# Patient Record
Sex: FEMALE | Race: WHITE | NOT HISPANIC OR LATINO | Employment: FULL TIME | URBAN - METROPOLITAN AREA
[De-identification: names, ages, dates, MRNs, and addresses within clinical notes are randomized per-mention and may not be internally consistent; named-entity substitution may affect disease eponyms.]

---

## 2017-05-14 ENCOUNTER — APPOINTMENT (OUTPATIENT)
Dept: CT IMAGING | Facility: CLINIC | Age: 52
End: 2017-05-14
Attending: EMERGENCY MEDICINE
Payer: COMMERCIAL

## 2017-05-14 ENCOUNTER — HOSPITAL ENCOUNTER (EMERGENCY)
Facility: CLINIC | Age: 52
Discharge: HOME | End: 2017-05-14
Attending: EMERGENCY MEDICINE | Admitting: EMERGENCY MEDICINE
Payer: COMMERCIAL

## 2017-05-14 VITALS
SYSTOLIC BLOOD PRESSURE: 155 MMHG | DIASTOLIC BLOOD PRESSURE: 88 MMHG | TEMPERATURE: 98.8 F | HEART RATE: 60 BPM | RESPIRATION RATE: 16 BRPM | HEIGHT: 60 IN | BODY MASS INDEX: 32.2 KG/M2 | OXYGEN SATURATION: 98 % | WEIGHT: 164 LBS

## 2017-05-14 DIAGNOSIS — R10.32 ABDOMINAL PAIN, LEFT LOWER QUADRANT: ICD-10-CM

## 2017-05-14 DIAGNOSIS — M25.552 HIP PAIN, LEFT: ICD-10-CM

## 2017-05-14 LAB
ALBUMIN UR-MCNC: NEGATIVE MG/DL
ANION GAP SERPL CALCULATED.3IONS-SCNC: 8 MMOL/L (ref 3–14)
APPEARANCE UR: CLEAR
BASOPHILS # BLD AUTO: 0.1 10E9/L (ref 0–0.2)
BASOPHILS NFR BLD AUTO: 0.8 %
BILIRUB UR QL STRIP: NEGATIVE
BUN SERPL-MCNC: 16 MG/DL (ref 7–30)
CALCIUM SERPL-MCNC: 8.6 MG/DL (ref 8.5–10.1)
CHLORIDE SERPL-SCNC: 107 MMOL/L (ref 94–109)
CO2 BLDCOV-SCNC: 24 MMOL/L (ref 21–28)
CO2 SERPL-SCNC: 25 MMOL/L (ref 20–32)
COLOR UR AUTO: YELLOW
CREAT SERPL-MCNC: 0.92 MG/DL (ref 0.52–1.04)
DIFFERENTIAL METHOD BLD: NORMAL
EOSINOPHIL # BLD AUTO: 0.2 10E9/L (ref 0–0.7)
EOSINOPHIL NFR BLD AUTO: 2.9 %
ERYTHROCYTE [DISTWIDTH] IN BLOOD BY AUTOMATED COUNT: 12.8 % (ref 10–15)
GFR SERPL CREATININE-BSD FRML MDRD: 63 ML/MIN/1.7M2
GLUCOSE SERPL-MCNC: 131 MG/DL (ref 70–99)
GLUCOSE UR STRIP-MCNC: NEGATIVE MG/DL
HCT VFR BLD AUTO: 43.5 % (ref 35–47)
HGB BLD-MCNC: 15.2 G/DL (ref 11.7–15.7)
HGB UR QL STRIP: NEGATIVE
IMM GRANULOCYTES # BLD: 0 10E9/L (ref 0–0.4)
IMM GRANULOCYTES NFR BLD: 0.4 %
KETONES UR STRIP-MCNC: NEGATIVE MG/DL
LACTATE BLD-SCNC: 1.8 MMOL/L (ref 0.7–2.1)
LEUKOCYTE ESTERASE UR QL STRIP: NEGATIVE
LYMPHOCYTES # BLD AUTO: 1.6 10E9/L (ref 0.8–5.3)
LYMPHOCYTES NFR BLD AUTO: 21.5 %
MCH RBC QN AUTO: 32.7 PG (ref 26.5–33)
MCHC RBC AUTO-ENTMCNC: 34.9 G/DL (ref 31.5–36.5)
MCV RBC AUTO: 94 FL (ref 78–100)
MONOCYTES # BLD AUTO: 0.5 10E9/L (ref 0–1.3)
MONOCYTES NFR BLD AUTO: 7.3 %
NEUTROPHILS # BLD AUTO: 4.9 10E9/L (ref 1.6–8.3)
NEUTROPHILS NFR BLD AUTO: 67.1 %
NITRATE UR QL: NEGATIVE
NRBC # BLD AUTO: 0 10*3/UL
NRBC BLD AUTO-RTO: 0 /100
PCO2 BLDV: 43 MM HG (ref 40–50)
PH BLDV: 7.35 PH (ref 7.32–7.43)
PH UR STRIP: 5.5 PH (ref 5–7)
PLATELET # BLD AUTO: 270 10E9/L (ref 150–450)
PO2 BLDV: 76 MM HG (ref 25–47)
POTASSIUM SERPL-SCNC: 4.2 MMOL/L (ref 3.4–5.3)
RBC # BLD AUTO: 4.65 10E12/L (ref 3.8–5.2)
SAO2 % BLDV FROM PO2: 94 %
SODIUM SERPL-SCNC: 140 MMOL/L (ref 133–144)
SP GR UR STRIP: 1.01 (ref 1–1.03)
URN SPEC COLLECT METH UR: NORMAL
UROBILINOGEN UR STRIP-ACNC: 0.2 EU/DL (ref 0.2–1)
WBC # BLD AUTO: 7.3 10E9/L (ref 4–11)

## 2017-05-14 PROCEDURE — 74176 CT ABD & PELVIS W/O CONTRAST: CPT

## 2017-05-14 PROCEDURE — 82803 BLOOD GASES ANY COMBINATION: CPT

## 2017-05-14 PROCEDURE — 80048 BASIC METABOLIC PNL TOTAL CA: CPT | Performed by: EMERGENCY MEDICINE

## 2017-05-14 PROCEDURE — 83605 ASSAY OF LACTIC ACID: CPT

## 2017-05-14 PROCEDURE — 99285 EMERGENCY DEPT VISIT HI MDM: CPT | Mod: 25

## 2017-05-14 PROCEDURE — 85025 COMPLETE CBC W/AUTO DIFF WBC: CPT | Performed by: EMERGENCY MEDICINE

## 2017-05-14 PROCEDURE — 96361 HYDRATE IV INFUSION ADD-ON: CPT

## 2017-05-14 PROCEDURE — 96374 THER/PROPH/DIAG INJ IV PUSH: CPT

## 2017-05-14 PROCEDURE — 25000128 H RX IP 250 OP 636: Performed by: EMERGENCY MEDICINE

## 2017-05-14 PROCEDURE — 81003 URINALYSIS AUTO W/O SCOPE: CPT | Performed by: EMERGENCY MEDICINE

## 2017-05-14 RX ORDER — IBUPROFEN 600 MG/1
600 TABLET, FILM COATED ORAL EVERY 6 HOURS PRN
Qty: 21 TABLET | Refills: 0 | Status: SHIPPED | OUTPATIENT
Start: 2017-05-14

## 2017-05-14 RX ORDER — MORPHINE SULFATE 4 MG/ML
4 INJECTION, SOLUTION INTRAMUSCULAR; INTRAVENOUS ONCE
Status: COMPLETED | OUTPATIENT
Start: 2017-05-14 | End: 2017-05-14

## 2017-05-14 RX ADMIN — SODIUM CHLORIDE 1000 ML: 9 INJECTION, SOLUTION INTRAVENOUS at 17:24

## 2017-05-14 RX ADMIN — MORPHINE SULFATE 4 MG: 4 INJECTION, SOLUTION INTRAMUSCULAR; INTRAVENOUS at 17:28

## 2017-05-14 ASSESSMENT — ENCOUNTER SYMPTOMS
BLOOD IN STOOL: 0
COUGH: 0
VOMITING: 0
WEAKNESS: 0
HEMATURIA: 0
DIARRHEA: 0
FLANK PAIN: 1
CHILLS: 0
ABDOMINAL PAIN: 1
NUMBNESS: 1
JOINT SWELLING: 0
SHORTNESS OF BREATH: 0
NAUSEA: 0
FREQUENCY: 0
DYSURIA: 0
CONSTIPATION: 0
FEVER: 0
DIFFICULTY URINATING: 0
ARTHRALGIAS: 1

## 2017-05-14 NOTE — ED PROVIDER NOTES
"  History     Chief Complaint:  Abdominal Pain and Hip Pain    HPI   Yesica Garcia is a 51 year old female smoker who presents with left sided abdominal pain and left hip pain. The patient reports she woke up 5 days ago with pain in her left lower back. The pain has been gradually wrapping around into her lower left abdomen and left hip, prompting her to come to the ED for evaluation. On arrival to the ED, the patient notes pain in her left lower abdomen, left flank, and left hip. She states her left hip feels \"numb\" and notes an intermittent burning sensation. She reports it hurts to bear weight on her left leg. The patient has been taking Tylenol without significant relief. She denies any rash, hematuria, dysuria, increased urinary frequency or urgency, diarrhea, nausea, vomiting, chest pain, shortness of breath, cough, or fever. No history of shingles or kidney stones. No recent falls or injuries.     Allergies:  No Known Allergies     Medications:    The patient is not currently taking any prescribed medications.    Past Medical History:    History reviewed. No pertinent past medical history.    Past Surgical History:    History reviewed. No pertinent surgical history.    Family History:    History reviewed. No pertinent family history.     Social History:  Smoking status: Current some day smoker  Alcohol use: Yes  Marital Status:       Review of Systems   Constitutional: Negative for chills and fever.   Respiratory: Negative for cough and shortness of breath.    Cardiovascular: Negative for chest pain and leg swelling.   Gastrointestinal: Positive for abdominal pain. Negative for blood in stool, constipation, diarrhea, nausea and vomiting.   Genitourinary: Positive for flank pain. Negative for difficulty urinating, dysuria, frequency, hematuria and urgency.   Musculoskeletal: Positive for arthralgias and gait problem (secondary to pain). Negative for joint swelling.   Skin: Negative for rash. "   Neurological: Positive for numbness (subjective numbness). Negative for weakness.   All other systems reviewed and are negative.      Physical Exam     Patient Vitals for the past 24 hrs:   BP Temp Temp src Pulse Resp SpO2 Height Weight   05/14/17 1644 (!) 169/106 98.8  F (37.1  C) Oral 59 20 99 % 1.524 m (5') 74.4 kg (164 lb)       Physical Exam  Physical Exam   General:  Sitting on bed with  at bedside.   HENT:  No obvious trauma to head  Right Ear:  External ear normal.   Left Ear:  External ear normal.   Nose:  Nose normal.   Eyes:  Conjunctivae and EOM are normal. Pupils are equal, round, and reactive.   Neck: Normal range of motion. Neck supple. No tracheal deviation present.   CV:  Normal heart sounds. No murmur heard.  Pulm/Chest: Effort normal and breath sounds normal.   Abd: Soft. No distension. Left lower quadrant tenderness. Mild left lower flank pain. Negative McBurney's and negative Hightower's. There is no rigidity, no rebound and no guarding.   M/S: Normal range of motion. Tenderness to palpation of the left iliac crest.   Neuro: Alert.   Skin: Skin is warm and dry. No rash noted. Not diaphoretic. No rash of shingles.   Psych: Normal mood and affect. Behavior is normal.     Emergency Department Course   Imaging:  Radiographic findings were communicated with the patient who voiced understanding of the findings.    CT-scan Abdomen/Pelvis w/o contrast:  No acute abnormality or cause for pain demonstrated.     Preliminary result per radiology.     Laboratory:  CBC: WNL (WBC 7.3, HGB 15.2, )   BMP: Glucose 131(H), o/w WNL (Creatinine 0.92)  ISTAT gases lactate nitesh: pO2 76(H), o/w WNL  Lactic acid: 1.8  UA: Negative    Interventions:  1724: NS 1L IV Bolus  1728: Morphine 4 mg IV    Emergency Department Course:  Past medical records, nursing notes, and vitals reviewed.  1654: I performed an exam of the patient and obtained history, as documented above.  IV inserted and blood drawn.  The patient  "was sent for a CT abdomen pelvis while in the emergency department, findings above.    1750: I rechecked the patient. Explained findings to the patient. Patient's pain improved after the above interventions.     The patient passed a \"road test\" prior to discharge from the ED.    I rechecked the patient.  Findings and plan explained to the Patient. Patient discharged home with instructions regarding supportive care, medications, and reasons to return. The importance of close follow-up was reviewed.     Impression & Plan      Medical Decision Making:  Yesica Garcia is a very pleasant 51 year old year old patient who presents to the emergency department with left lower quadrant abdominal pain. She has had it for 5 days. She reports the pain is left flank as well and in the left hip. The patient's abdominal exam reveals left lower quadrant tenderness. Lactate is negative. I doubt mesenteric ischemia. White count is normal. I was concerned about kidney stone and a CT is performed that shows no evidence of this. Urine analysis has no evidence of infection. There is no inflammation around the kidneys to suggest pyelonephritis. The patient denies any trauma and she is able to ambulate on it. I doubt fracture and there is no fracture seen on CT within the pelvis or hip. The patient felt better after the above interventions. I considered septic joint but the patient has no risk factors other than smoking and she is afebrile and has no leukocytosis. Reviewed risk and benefit of arthrocentesis and she agreed against testing now.  I advised her to return if the pain is persistent or worsening.     I also considered shingles but there is no rash of shingles. I advised her to return if she develops a rash. I suspect the pain is more musculoskeletal in nature. I recommended taking Ibuprofen on a regular basis and certainly return if the pain worsens or she develops a fever.     The treatment plan was discussed with the " patient and they expressed understanding of this plan and consented to the plan.  In addition, the patient will return to the emergency department if their symptoms persist, worsen, if new symptoms arise or if there is any concern as other pathology may be present that is not evident at this time. They also understand the importance of close follow up in the clinic and if unable to do so will return to the emergency department for a reevaluation. All questions were answered.    Diagnosis:    ICD-10-CM   1. Abdominal pain, left lower quadrant R10.32   2. Hip pain, left M25.552     Disposition: Discharged to home    Discharge Medications:  New Prescriptions    IBUPROFEN (ADVIL/MOTRIN) 600 MG TABLET    Take 1 tablet (600 mg) by mouth every 6 hours as needed for moderate pain     Jacquie Jung  5/14/2017    EMERGENCY DEPARTMENT    IJacquie, am serving as a scribe at 4:54 PM on 5/14/2017 to document services personally performed by Francisco J Al DO based on my observations and the provider's statements to me.        Francisco J Al DO  05/14/17 1846

## 2017-05-14 NOTE — ED AVS SNAPSHOT
Emergency Department    [address] [facility] [address]    Phone:  [phone]    Fax:  [phone]                                       Yesica Garcia   MRN: [id number]    Department:   Emergency Department   Date of Visit:  5/14/2017           Patient Information     Date Of Birth          1965        Your diagnoses for this visit were:     Abdominal pain, left lower quadrant     Hip pain, left        You were seen by Francisco J Al DO.      Follow-up Information     Follow up with Norma Villa In 2 days.    Contact information:    [place] [address] [place] [address]  [phone]          Follow up with  Emergency Department.    Specialty:  EMERGENCY MEDICINE    Why:  If symptoms worsen    Contact information:    [address] [facility] [address] [phone]        Discharge Instructions         Abdominal Pain  Abdominal pain is pain in the stomach or intestinal area. Everyone has this pain from time to time. In many cases it goes away on its own. But abdominal pain can sometimes be due to a serious problem, such as appendicitis. So it s important to know when to seek help.  Causes of abdominal pain  There are many possible causes of abdominal pain. Common causes in adults include:    Constipation, diarrhea, or gas    GERD (gastroesophageal reflux disease) movement of stomach acid into the esophagus, also known as acid reflux or heartburn    Peptic ulcer (a sore in the lining of the stomach or small intestine)    Inflammation of the gallbladder, liver, or pancreas    Gallstones or kidney stones    Appendicitis     Obstruction of the intestines     Hernia (bulging of an internal organ through a muscle or other tissue)    Urinary tract infections    In women, menstrual cramps, fibroids, or endometriosis of the uterus    Inflammation or infection of the intestines  Diagnosing the cause of abdominal pain  Your health care  provider will examine you to help find the cause of your pain. If needed, tests will be ordered. Because abdominal pain has so many possible causes, it can be hard to discover the reason for the pain. Giving details about your pain can help. Be ready to tell your health care provider where and when you feel the pain and what makes it better or worse. Also mention whether you have other symptoms such as fever, tiredness, nausea, vomiting, or changes in bathroom habits.  Treating abdominal pain  Certain causes of pain, such as appendicitis or a bowel obstruction, need emergency treatment. Other problems can be treated with rest, fluids, or medications. Your health care provider can give you specific instructions for treatment or self-care based on the cause of your pain.  If you have vomiting or diarrhea, sip water or other clear fluids. When you are ready to eat solid foods again, start with small amounts of easy-to-digest, low-fat foods, such as applesauce, toast, or crackers.   When to call the doctor  Call 911 or go to the hospital right away if you:    Can t pass stool and are vomiting    Are vomiting blood or have black, tarry diarrhea    Also have chest, neck, or shoulder pain    Feel like you are about to pass out    Have pain in your shoulder blades with nausea    Have sudden, excruciating abdominal pain    Have new, severe pain unlike any you have felt before    Have a belly that is rigid, hard, and tender to touch  Call your doctor if you have:    Pain for more than 5 days    Bloating for more than 2 days    Diarrhea for more than 5 days    Fever of 101 F (38.3 C) or higher    Pain that continues to worsen    Unexplained weight loss    Continued lack of appetite    Blood in the stool  How to prevent abdominal pain  Here are some tips to help prevent abdominal pain:    Eat smaller amounts of food at one time.    Avoid greasy, fried, or other high-fat foods.    Avoid foods that give you gas.    Exercise  regularly.    Drink plenty of fluids.  To help prevent symptoms of gastroesophageal reflux disease (GERD):    Quit smoking.    Reduce alcohol and certain foods that increase stomach acid.     Lose excess weight.    Finish eating at least 2 hours before you go to bed or lie down.    Elevate the head of your bed.    7380-7330 [employer]. [address] [place] [address], [place] [address]. All rights reserved. This information is not intended as a substitute for professional medical care. Always follow your healthcare professional's instructions.          Hip Strain    You have a strain of the muscles around the hip joint. A muscle strain is a stretching or tearing of muscle fibers. This causes pain, especially when you move that muscle. There may also be some swelling and bruising.  Home care    Stay off the injured leg as much as possible until you can walk on it without pain. If you have a lot of pain with walking, crutches or a walker may be prescribed. These can be rented or purchased at many pharmacies and surgical or orthopedic supply stores. Follow your healthcare provider's advice regarding when to begin putting weight on that leg.    Apply an ice pack over the injured area for 15 to 20 minutes every 3 to 6 hours. You should do this for the first 24 to 48 hours. You can make an ice pack by filling a plastic bag that seals at the top with ice cubes and then wrapping it with a thin towel. Be careful not to injure your skin with the ice treatments. Ice should never be applied directly to skin. Continue the use of ice packs for relief of pain and swelling as needed. After 48 hours, apply heat (warm shower or warm bath) for 15 to 20 minutes several times a day, or alternate ice and heat.    You may use over-the-counter pain medicine to control pain, unless another pain medicine was prescribed. If you have chronic liver or kidney disease or ever had a stomach ulcer or GI bleeding, talk with your healthcare  provider before using these medicines.    If you play sports, you may resume these activities when you are able to hop and run on the injured leg without pain.  Follow-up care  Follow up with your healthcare provider, or as advised. If your symptoms do not begin to get better after a week, more tests may be needed.  If X-rays were taken, you will be told of any new findings that may affect your care.  When to seek medical advice  Call your healthcare provider right away if any of these occur:    Increased swelling or  bruising    Increased pain    Losing the ability to put weight on the injured side    0257-2804 [employer]. [address] [place] [address]. All rights reserved. This information is not intended as a substitute for professional medical care. Always follow your healthcare professional's instructions.          24 Hour Appointment Hotline       To make an appointment at any [facility], call [phone] ([phone]). If you don't have a family doctor or clinic, we will help you find one. [facility] are conveniently located to serve the needs of you and your family.             Review of your medicines      START taking        Dose / Directions Last dose taken    ibuprofen 600 MG tablet   Commonly known as:  ADVIL/MOTRIN   Dose:  600 mg   Quantity:  21 tablet        Take 1 tablet (600 mg) by mouth every 6 hours as needed for moderate pain   Refills:  0                Prescriptions were sent or printed at these locations (1 Prescription)                   [facility] [address] [place] [address] [address] [address]    Telephone:  [phone]   Fax:  [phone]   Hours:                  E-Prescribed (1 of 1)         ibuprofen (ADVIL/MOTRIN) 600 MG tablet                Procedures and tests performed during your visit     *UA reflex to Microscopic (ED Lab POCT Only 3-11)    Abd/pelvis CT no contrast -  [facility] Protocol    Basic metabolic panel    CBC with platelets differential    ISTAT gases lactate nitesh POCT      Orders Needing Specimen Collection     None      Pending Results     No orders found from 5/12/2017 to 5/15/2017.            Pending Culture Results     No orders found from 5/12/2017 to 5/15/2017.            Pending Results Instructions     If you had any lab results that were not finalized at the time of your Discharge, you can call the ED Lab Result RN at [phone]. You will be contacted by this team for any positive Lab results or changes in treatment. The nurses are available 7 days a week from 10A to 6:30P.  You can leave a message 24 hours per day and they will return your call.        Test Results From Your Hospital Stay        5/14/2017  5:36 PM      Component Results     Component Value Ref Range & Units Status    WBC 7.3 4.0 - 11.0 10e9/L Final    RBC Count 4.65 3.8 - 5.2 10e12/L Final    Hemoglobin 15.2 11.7 - 15.7 g/dL Final    Hematocrit 43.5 35.0 - 47.0 % Final    MCV 94 78 - 100 fl Final    MCH 32.7 26.5 - 33.0 pg Final    MCHC 34.9 31.5 - 36.5 g/dL Final    RDW 12.8 10.0 - 15.0 % Final    Platelet Count 270 150 - 450 10e9/L Final    Diff Method Automated Method  Final    % Neutrophils 67.1 % Final    % Lymphocytes 21.5 % Final    % Monocytes 7.3 % Final    % Eosinophils 2.9 % Final    % Basophils 0.8 % Final    % Immature Granulocytes 0.4 % Final    Nucleated RBCs 0 0 /100 Final    Absolute Neutrophil 4.9 1.6 - 8.3 10e9/L Final    Absolute Lymphocytes 1.6 0.8 - 5.3 10e9/L Final    Absolute Monocytes 0.5 0.0 - 1.3 10e9/L Final    Absolute Eosinophils 0.2 0.0 - 0.7 10e9/L Final    Absolute Basophils 0.1 0.0 - 0.2 10e9/L Final    Abs Immature Granulocytes 0.0 0 - 0.4 10e9/L Final    Absolute Nucleated RBC 0.0  Final         5/14/2017  5:49 PM      Component Results     Component Value Ref Range & Units Status    Sodium 140 133 - 144 mmol/L Final    Potassium 4.2 3.4 - 5.3 mmol/L Final     Chloride 107 94 - 109 mmol/L Final    Carbon Dioxide 25 20 - 32 mmol/L Final    Anion Gap 8 3 - 14 mmol/L Final    Glucose 131 (H) 70 - 99 mg/dL Final    Urea Nitrogen 16 7 - 30 mg/dL Final    Creatinine 0.92 0.52 - 1.04 mg/dL Final    GFR Estimate 63 >60 mL/min/1.7m2 Final    Non  GFR Calc    GFR Estimate If Black 77 >60 mL/min/1.7m2 Final    African American GFR Calc    Calcium 8.6 8.5 - 10.1 mg/dL Final         5/14/2017  6:04 PM      Component Results     Component Value Ref Range & Units Status    Color Urine Yellow  Final    Appearance Urine Clear  Final    Glucose Urine Negative NEG mg/dL Final    Bilirubin Urine Negative NEG Final    Ketones Urine Negative NEG mg/dL Final    Specific Gravity Urine 1.015 1.003 - 1.035 Final    Blood Urine Negative NEG Final    pH Urine 5.5 5.0 - 7.0 pH Final    Protein Albumin Urine Negative NEG mg/dL Final    Urobilinogen Urine 0.2 0.2 - 1.0 EU/dL Final    Nitrite Urine Negative NEG Final    Leukocyte Esterase Urine Negative NEG Final    Source Midstream Urine  Final         5/14/2017  6:15 PM      Narrative     CT ABDOMEN PELVIS WITHOUT CONTRAST 5/14/2017 5:45 PM    TECHNIQUE: Images from diaphragm to pubic symphysis without oral or IV  contrast. Radiation dose for this scan was reduced using automated  exposure control, adjustment of the mA and/or kV according to patient  size, or iterative reconstruction technique.    HISTORY: Left flank and left lower quadrant pain. Patient also has  some pain with palpation of the left iliac crest and pain in the left  hip with ambulating, but no recent trauma.    COMPARISON: None.    FINDINGS:   Abdomen and Pelvis: No hydronephrosis or urinary tract calculi.  Kidneys appear unremarkable.    Scattered thin-walled cysts at the lung bases and discoid atelectasis  inferior right middle lobe. Within the limits of a noncontrast exam  the liver, gallbladder, spleen, pancreas and adrenal glands appear  normal.     Calcified  nonaneurysmal aorta and iliacs. No periaortic or pelvic  adenopathy or free fluid. Scattered colonic diverticulosis. No acute  bowel abnormality. Normal appendix. No dominant ovarian or adnexal  cyst.        Impression     IMPRESSION: No acute abnormality or cause for pain demonstrated.       CHRIS NÚÑEZ MD               5/14/2017  5:41 PM      Component Results     Component Value Ref Range & Units Status    Ph Venous 7.35 7.32 - 7.43 pH Final    PCO2 Venous 43 40 - 50 mm Hg Final    PO2 Venous 76 (H) 25 - 47 mm Hg Final    Bicarbonate Venous 24 21 - 28 mmol/L Final    O2 Sat Venous 94 % Final    Lactic Acid 1.8 0.7 - 2.1 mmol/L Final                Clinical Quality Measure: Blood Pressure Screening     Your blood pressure was checked while you were in the emergency department today. The last reading we obtained was  BP: (!) 169/106 . Please read the guidelines below about what these numbers mean and what you should do about them.  If your systolic blood pressure (the top number) is less than 120 and your diastolic blood pressure (the bottom number) is less than 80, then your blood pressure is normal. There is nothing more that you need to do about it.  If your systolic blood pressure (the top number) is 120-139 or your diastolic blood pressure (the bottom number) is 80-89, your blood pressure may be higher than it should be. You should have your blood pressure rechecked within a year by a primary care provider.  If your systolic blood pressure (the top number) is 140 or greater or your diastolic blood pressure (the bottom number) is 90 or greater, you may have high blood pressure. High blood pressure is treatable, but if left untreated over time it can put you at risk for heart attack, stroke, or kidney failure. You should have your blood pressure rechecked by a primary care provider within the next 4 weeks.  If your provider in the emergency department today gave you specific instructions to follow-up with  "your doctor or provider even sooner than that, you should follow that instruction and not wait for up to 4 weeks for your follow-up visit.        Thank you for choosing [facility]       Thank you for choosing [facility] for your care. Our goal is always to provide you with excellent care. Hearing back from our patients is one way we can continue to improve our services. Please take a few minutes to complete the written survey that you may receive in the mail after you visit with us. Thank you!        [employer] Information     [employer] lets you send messages to your doctor, view your test results, renew your prescriptions, schedule appointments and more. To sign up, go to [email or web address] . Click on \"Log in\" on the left side of the screen, which will take you to the Welcome page. Then click on \"Sign up Now\" on the right side of the page.     You will be asked to enter the access code listed below, as well as some personal information. Please follow the directions to create your username and password.     Your access code is: [email or web address]  Expires: 2017  6:38 PM     Your access code will  in 90 days. If you need help or a new code, please call your [facility] clinic or [phone].        Care EveryWhere ID     This is your Care EveryWhere ID. This could be used by other organizations to access your [facility] medical records  [id number]        After Visit Summary       This is your record. Keep this with you and show to your community pharmacist(s) and doctor(s) at your next visit.                  "

## 2017-05-14 NOTE — ED AVS SNAPSHOT
Emergency Department    [address] [place] [address]    Phone:  [phone]    Fax:  [phone]                                       Yesica Garcia   MRN: [id number]    Department:   Emergency Department   Date of Visit:  5/14/2017           After Visit Summary Signature Page     I have received my discharge instructions, and my questions have been answered. I have discussed any challenges I see with this plan with the nurse or doctor.    ..........................................................................................................................................  Patient/Patient Representative Signature      ..........................................................................................................................................  Patient Representative Print Name and Relationship to Patient    ..................................................               ................................................  Date                                            Time    ..........................................................................................................................................  Reviewed by Signature/Title    ...................................................              ..............................................  Date                                                            Time

## 2017-05-14 NOTE — DISCHARGE INSTRUCTIONS
Abdominal Pain  Abdominal pain is pain in the stomach or intestinal area. Everyone has this pain from time to time. In many cases it goes away on its own. But abdominal pain can sometimes be due to a serious problem, such as appendicitis. So it s important to know when to seek help.  Causes of abdominal pain  There are many possible causes of abdominal pain. Common causes in adults include:    Constipation, diarrhea, or gas    GERD (gastroesophageal reflux disease) movement of stomach acid into the esophagus, also known as acid reflux or heartburn    Peptic ulcer (a sore in the lining of the stomach or small intestine)    Inflammation of the gallbladder, liver, or pancreas    Gallstones or kidney stones    Appendicitis     Obstruction of the intestines     Hernia (bulging of an internal organ through a muscle or other tissue)    Urinary tract infections    In women, menstrual cramps, fibroids, or endometriosis of the uterus    Inflammation or infection of the intestines  Diagnosing the cause of abdominal pain  Your health care provider will examine you to help find the cause of your pain. If needed, tests will be ordered. Because abdominal pain has so many possible causes, it can be hard to discover the reason for the pain. Giving details about your pain can help. Be ready to tell your health care provider where and when you feel the pain and what makes it better or worse. Also mention whether you have other symptoms such as fever, tiredness, nausea, vomiting, or changes in bathroom habits.  Treating abdominal pain  Certain causes of pain, such as appendicitis or a bowel obstruction, need emergency treatment. Other problems can be treated with rest, fluids, or medications. Your health care provider can give you specific instructions for treatment or self-care based on the cause of your pain.  If you have vomiting or diarrhea, sip water or other clear fluids. When you are ready to eat solid foods again, start with  small amounts of easy-to-digest, low-fat foods, such as applesauce, toast, or crackers.   When to call the doctor  Call 911 or go to the hospital right away if you:    Can t pass stool and are vomiting    Are vomiting blood or have black, tarry diarrhea    Also have chest, neck, or shoulder pain    Feel like you are about to pass out    Have pain in your shoulder blades with nausea    Have sudden, excruciating abdominal pain    Have new, severe pain unlike any you have felt before    Have a belly that is rigid, hard, and tender to touch  Call your doctor if you have:    Pain for more than 5 days    Bloating for more than 2 days    Diarrhea for more than 5 days    Fever of 101 F (38.3 C) or higher    Pain that continues to worsen    Unexplained weight loss    Continued lack of appetite    Blood in the stool  How to prevent abdominal pain  Here are some tips to help prevent abdominal pain:    Eat smaller amounts of food at one time.    Avoid greasy, fried, or other high-fat foods.    Avoid foods that give you gas.    Exercise regularly.    Drink plenty of fluids.  To help prevent symptoms of gastroesophageal reflux disease (GERD):    Quit smoking.    Reduce alcohol and certain foods that increase stomach acid.     Lose excess weight.    Finish eating at least 2 hours before you go to bed or lie down.    Elevate the head of your bed.    4125-2663 [employer]. [address] [place] [address], [place] [id number]. All rights reserved. This information is not intended as a substitute for professional medical care. Always follow your healthcare professional's instructions.          Hip Strain    You have a strain of the muscles around the hip joint. A muscle strain is a stretching or tearing of muscle fibers. This causes pain, especially when you move that muscle. There may also be some swelling and bruising.  Home care    Stay off the injured leg as much as possible until you can walk on it without pain. If you have a  lot of pain with walking, crutches or a walker may be prescribed. These can be rented or purchased at many pharmacies and surgical or orthopedic supply stores. Follow your healthcare provider's advice regarding when to begin putting weight on that leg.    Apply an ice pack over the injured area for 15 to 20 minutes every 3 to 6 hours. You should do this for the first 24 to 48 hours. You can make an ice pack by filling a plastic bag that seals at the top with ice cubes and then wrapping it with a thin towel. Be careful not to injure your skin with the ice treatments. Ice should never be applied directly to skin. Continue the use of ice packs for relief of pain and swelling as needed. After 48 hours, apply heat (warm shower or warm bath) for 15 to 20 minutes several times a day, or alternate ice and heat.    You may use over-the-counter pain medicine to control pain, unless another pain medicine was prescribed. If you have chronic liver or kidney disease or ever had a stomach ulcer or GI bleeding, talk with your healthcare provider before using these medicines.    If you play sports, you may resume these activities when you are able to hop and run on the injured leg without pain.  Follow-up care  Follow up with your healthcare provider, or as advised. If your symptoms do not begin to get better after a week, more tests may be needed.  If X-rays were taken, you will be told of any new findings that may affect your care.  When to seek medical advice  Call your healthcare provider right away if any of these occur:    Increased swelling or  bruising    Increased pain    Losing the ability to put weight on the injured side    8987-3099 [employer]. [address] [place] [address], [place] [address]. All rights reserved. This information is not intended as a substitute for professional medical care. Always follow your healthcare professional's instructions.

## 2021-05-10 ENCOUNTER — OFFICE VISIT (OUTPATIENT)
Dept: URGENT CARE | Facility: URGENT CARE | Age: 56
End: 2021-05-10

## 2021-05-10 VITALS
WEIGHT: 161 LBS | BODY MASS INDEX: 31.44 KG/M2 | SYSTOLIC BLOOD PRESSURE: 145 MMHG | OXYGEN SATURATION: 98 % | DIASTOLIC BLOOD PRESSURE: 89 MMHG | HEART RATE: 60 BPM | TEMPERATURE: 96.7 F

## 2021-05-10 DIAGNOSIS — J44.9 CHRONIC OBSTRUCTIVE PULMONARY DISEASE, UNSPECIFIED COPD TYPE (H): Primary | ICD-10-CM

## 2021-05-10 PROCEDURE — 99204 OFFICE O/P NEW MOD 45 MIN: CPT

## 2021-05-10 RX ORDER — PREDNISONE 10 MG/1
TABLET ORAL
Qty: 30 TABLET | Refills: 0 | Status: SHIPPED | OUTPATIENT
Start: 2021-05-10 | End: 2022-02-04

## 2021-05-10 RX ORDER — ALBUTEROL SULFATE 90 UG/1
2 AEROSOL, METERED RESPIRATORY (INHALATION) EVERY 6 HOURS PRN
Status: DISCONTINUED | OUTPATIENT
Start: 2021-05-10 | End: 2022-02-04

## 2021-05-10 NOTE — PROGRESS NOTES
S: Very pleasant 55-year-old female presents with 1 month of mild shortness of breath and mild wheezing.  She has a long smoking history, has not sought medical care in quite some time and came back from [place] yesterday.  Socially she smokes and she works as a .  She has no health insurance or money.  ROS: Negative for fever.  Positive for occasional white sputum.  Negative for green sputum.  Negative for vomiting or diarrhea.    Meds: None    O: Blood pressure 145/89, pulse 60, respiration 18, temperature 96.7, O2 sat 98% on room air  Alert conversant and pleasant although somewhat wheezy when she breathes  Lungs-mild wheezes bilaterally  Heart-regular rate and rhythm  Skin-pink and dry    A: COPD exacerbation  Smoking    P: The patient has no sputum production, no fever and no crackles in the lungs.  There is scant wheezes bilaterally consistent with COPD.  I encourage smoking cessation.  Patient would benefit from nebulizer but currently that is unavailable in this clinic.  Prescribed Ventolin MDI to be used every 6 hours in the near term.  In addition prednisone taper with 40 mg daily x3 with a 10 mg taper every 3 days.  I implored her to follow-up with a physician before prednisone is used up in order to seek further care for her COPD.  Patient was stable on discharge, thankful, and all questions were answered

## 2022-02-04 ENCOUNTER — HOSPITAL ENCOUNTER (EMERGENCY)
Facility: CLINIC | Age: 57
Discharge: HOME | End: 2022-02-04
Attending: EMERGENCY MEDICINE | Admitting: EMERGENCY MEDICINE

## 2022-02-04 ENCOUNTER — APPOINTMENT (OUTPATIENT)
Dept: GENERAL RADIOLOGY | Facility: CLINIC | Age: 57
End: 2022-02-04
Attending: EMERGENCY MEDICINE

## 2022-02-04 VITALS
RESPIRATION RATE: 20 BRPM | BODY MASS INDEX: 31.25 KG/M2 | SYSTOLIC BLOOD PRESSURE: 130 MMHG | TEMPERATURE: 97.7 F | DIASTOLIC BLOOD PRESSURE: 91 MMHG | HEART RATE: 78 BPM | WEIGHT: 160 LBS | OXYGEN SATURATION: 98 %

## 2022-02-04 DIAGNOSIS — R05.9 COUGH: ICD-10-CM

## 2022-02-04 DIAGNOSIS — R06.02 SHORTNESS OF BREATH: ICD-10-CM

## 2022-02-04 DIAGNOSIS — J44.1 COPD EXACERBATION (H): Primary | ICD-10-CM

## 2022-02-04 LAB
ATRIAL RATE - MUSE: 78 BPM
DIASTOLIC BLOOD PRESSURE - MUSE: NORMAL MMHG
INTERPRETATION ECG - MUSE: NORMAL
P AXIS - MUSE: 115 DEGREES
PR INTERVAL - MUSE: 128 MS
QRS DURATION - MUSE: 80 MS
QT - MUSE: 388 MS
QTC - MUSE: 442 MS
R AXIS - MUSE: 130 DEGREES
SYSTOLIC BLOOD PRESSURE - MUSE: NORMAL MMHG
T AXIS - MUSE: 239 DEGREES
VENTRICULAR RATE- MUSE: 78 BPM

## 2022-02-04 PROCEDURE — 99284 EMERGENCY DEPT VISIT MOD MDM: CPT | Mod: 25

## 2022-02-04 PROCEDURE — 250N000009 HC RX 250

## 2022-02-04 PROCEDURE — 250N000012 HC RX MED GY IP 250 OP 636 PS 637: Performed by: EMERGENCY MEDICINE

## 2022-02-04 PROCEDURE — 71046 X-RAY EXAM CHEST 2 VIEWS: CPT

## 2022-02-04 PROCEDURE — 250N000009 HC RX 250: Performed by: EMERGENCY MEDICINE

## 2022-02-04 PROCEDURE — 93005 ELECTROCARDIOGRAM TRACING: CPT

## 2022-02-04 PROCEDURE — 94640 AIRWAY INHALATION TREATMENT: CPT

## 2022-02-04 RX ORDER — PREDNISONE 20 MG/1
60 TABLET ORAL ONCE
Status: COMPLETED | OUTPATIENT
Start: 2022-02-04 | End: 2022-02-04

## 2022-02-04 RX ORDER — IPRATROPIUM BROMIDE AND ALBUTEROL SULFATE 2.5; .5 MG/3ML; MG/3ML
3 SOLUTION RESPIRATORY (INHALATION) ONCE
Status: COMPLETED | OUTPATIENT
Start: 2022-02-04 | End: 2022-02-04

## 2022-02-04 RX ORDER — ALBUTEROL SULFATE 90 UG/1
2 AEROSOL, METERED RESPIRATORY (INHALATION) EVERY 6 HOURS PRN
Qty: 18 G | Refills: 1 | Status: SHIPPED | OUTPATIENT
Start: 2022-02-04

## 2022-02-04 RX ORDER — IPRATROPIUM BROMIDE AND ALBUTEROL SULFATE 2.5; .5 MG/3ML; MG/3ML
SOLUTION RESPIRATORY (INHALATION)
Status: COMPLETED
Start: 2022-02-04 | End: 2022-02-04

## 2022-02-04 RX ORDER — PREDNISONE 20 MG/1
TABLET ORAL
Qty: 10 TABLET | Refills: 0 | Status: SHIPPED | OUTPATIENT
Start: 2022-02-04 | End: 2022-03-23

## 2022-02-04 RX ORDER — IPRATROPIUM BROMIDE AND ALBUTEROL SULFATE 2.5; .5 MG/3ML; MG/3ML
1 SOLUTION RESPIRATORY (INHALATION) EVERY 4 HOURS PRN
Qty: 150 ML | Refills: 0 | Status: SHIPPED | OUTPATIENT
Start: 2022-02-04

## 2022-02-04 RX ORDER — IPRATROPIUM BROMIDE AND ALBUTEROL SULFATE 2.5; .5 MG/3ML; MG/3ML
3 SOLUTION RESPIRATORY (INHALATION)
Status: DISCONTINUED | OUTPATIENT
Start: 2022-02-04 | End: 2022-02-04 | Stop reason: HOSPADM

## 2022-02-04 RX ADMIN — IPRATROPIUM BROMIDE AND ALBUTEROL SULFATE 3 ML: .5; 3 SOLUTION RESPIRATORY (INHALATION) at 13:24

## 2022-02-04 RX ADMIN — IPRATROPIUM BROMIDE AND ALBUTEROL SULFATE 3 ML: .5; 3 SOLUTION RESPIRATORY (INHALATION) at 14:15

## 2022-02-04 RX ADMIN — PREDNISONE 60 MG: 20 TABLET ORAL at 14:13

## 2022-02-04 RX ADMIN — IPRATROPIUM BROMIDE AND ALBUTEROL SULFATE 3 ML: 2.5; .5 SOLUTION RESPIRATORY (INHALATION) at 13:24

## 2022-02-04 ASSESSMENT — ENCOUNTER SYMPTOMS
SHORTNESS OF BREATH: 1
COUGH: 1
WHEEZING: 1

## 2022-02-04 NOTE — ED PROVIDER NOTES
History   Chief Complaint:  Shortness of Breath     HPI   Yesica Garcia is a 56 year old female with history of tobacco abuse who presents with shortness of breath, congestion, and wheezing for the past 2-3 weeks. Has chest pain when she is gasping for air. States she had similar shortness of breath last May and was prescribed an inhaler and prednisone which resolved the symptoms. Doesn't usually have asthma. Endorses smoking about a pack a day for the past 45 years. Patient was given a neb in triage and states it helped her wheezing a lot.     Review of Systems   HENT: Positive for congestion.    Respiratory: Positive for cough, shortness of breath and wheezing.    All other systems reviewed and are negative.    Allergies:  The patient has no known allergies.     Medications:  The patient is currently on no regular medications.     Past Medical History:     Asthma  Tobacco abuse  Hypercholesteremia  Menopausal syndrome    Fibrocystic breast    Past Surgical History:    Breast biopsy    Family History:    Father: throat cancer  Mother: liver disease    Social History:  The patient presents to the ED alone.   The patient is COVID vaccinated (x3) per the [facility].   Patient is a pack-a-day smoker for the past 45 years.     Physical Exam     Patient Vitals for the past 24 hrs:   BP Temp Temp src Pulse Resp SpO2 Weight   02/04/22 1515 -- -- -- -- -- 98 % --   02/04/22 1503 -- -- -- -- -- -- 72.6 kg (160 lb)   02/04/22 1500 (!) 130/91 -- -- 78 -- 96 % --   02/04/22 1430 (!) 117/99 -- -- 76 -- 96 % --   02/04/22 1415 (!) 121/102 -- -- 75 -- 98 % --   02/04/22 1332 (!) 142/93 97.7  F (36.5  C) Oral 81 20 96 % --   02/04/22 1322 (!) 173/107 97  F (36.1  C) Temporal 91 24 97 % --       Physical Exam  General: Alert, appears well-developed and well-nourished. Cooperative.     In mild distress  HEENT:  Head:  Atraumatic  Ears:  External ears are normal  Mouth/Throat:  Oropharynx is without erythema or exudate and  mucous membranes are moist.   Eyes:   Conjunctivae normal and EOM are normal. No scleral icterus.  CV:  Normal rate, regular rhythm, normal heart sounds and radial pulses are 2+ and symmetric.  No murmur.  Resp:  Breath sounds are coarse bilaterally with faint expiratory wheezing.     Non-labored, no retractions or accessory muscle use  GI:  Abdomen is soft, no distension, no tenderness. No rebound or guarding.  No CVA tenderness bilaterally  MS:  Normal range of motion. No edema.    Normal strength in all 4 extremities.     Back atraumatic.    No midline cervical, thoracic, or lumbar tenderness  Skin:  Warm and dry.  No rash or lesions noted.  Neuro: Alert. Normal strength.  GCS: 15  Psych:  Normal mood and affect.    Emergency Department Course   ECG  ECG obtained at 1340, ECG read at 1349  Suspect arm lead reversal, interpretation assumes no reversal.   Normal sinus rhythm. Right axis deviation. T wave abnormality, consider inferior ischemia. Abnormal ECG.    Rate 78 bpm. NJ interval 128 ms. QRS duration 80 ms. QT/QTc 388/442 ms. P-R-T axes 115 130 239.     Imaging:  XR Chest 2 Views   Final Result   IMPRESSION: There are no acute infiltrates. The cardiac silhouette is   not enlarged. Pulmonary vasculature is unremarkable.      GEOVANY RODRIGUEZ MD            SYSTEM ID:  [id number]        Report per radiology    Emergency Department Course:         Reviewed:  I reviewed nursing notes, vitals, past medical history, Care Everywhere and [facility]    Assessments:  1350 I obtained history and examined the patient as noted above.   1519 I rechecked the patient and explained findings.     Interventions:  1324 DuoNeb 3mL nebulization  1413 Deltasone 60mg oral  1415 DuoNeb 3mL nebulization     Disposition:  The patient was discharged to home.     Impression & Plan   Medical Decision Making:  Yesica Garcia is a 56 year old female with a history of 45 pack year tobacco use hx who presents for evaluation of shortness of  breath.   Signs and symptoms are consistent with COPD exacerbation.  A broad differential was considered including foreign body, COPD, viral induced reactive airway disease, pneumothorax, cardiac equivalent, allergic phenomena, pneumonia, bronchitis, etc.  Patient feels improved after interventions here in ED.   No indication for hospitalization at this time including no hypoxia, no marked increase in respiratory rate, minimal to no retractions.   Supportive outpatient management is indicated, medications for discharge noted above.  Close followup with primary care physician.  Return if increased wheezing, progressive shortness of breath, develops fever greater than 102.       There are no signs at this point of any other serious etiologies including those mentioned above, especially acute coronary syndrome. I doubt this is ACS given the classic story of COPD exacerbation given by the patient, the marked wheezing without rales and a nonspecific EKG.  No signs of pneumonia by CXR.      Given no increased sputum production, no antibiotics indicated at this time    Diagnosis:    ICD-10-CM    1. COPD exacerbation (H)  J44.1    2. Shortness of breath  R06.02    3. Cough  R05.9        Discharge Medications:  New Prescriptions    ALBUTEROL (PROAIR HFA/PROVENTIL HFA/VENTOLIN HFA) 108 (90 BASE) MCG/ACT INHALER    Inhale 2 puffs into the lungs every 6 hours as needed for shortness of breath / dyspnea or wheezing    IPRATROPIUM - ALBUTEROL 0.5 MG/2.5 MG/3 ML (DUONEB) 0.5-2.5 (3) MG/3ML NEB SOLUTION    Take 1 vial (3 mLs) by nebulization every 4 hours as needed for shortness of breath / dyspnea or wheezing    PREDNISONE (DELTASONE) 20 MG TABLET    Take two tablets (= 40mg) each day for 5 (five) days       Scribe Disclosure:  I, Gretchen Pollack, am serving as a scribe at 1:29 PM on 2/4/2022 to document services personally performed by Randall Xavier MD based on my observations and the provider's statements to me.      [place]ita  MD Randall  02/04/22 [id number]

## 2022-02-08 ENCOUNTER — TELEPHONE (OUTPATIENT)
Dept: NURSING | Facility: CLINIC | Age: 57
End: 2022-02-08

## 2022-02-08 NOTE — TELEPHONE ENCOUNTER
Patient calling with questions about:    Reports that she 'coughed up a big lump of skin last PM'  She has been using a nebulizer on 2/6/22 as well as taking prednisone.    Patient states that she is now able to breath and is feeling much better since coughing up 'that thing' from her throat.  Her question today is, should she take this lump of skin in to a lab somewhere to have it analyzed?  She says she currently has no insurance but is working on getting some and will want to establish care at [facility] in [place].     Patient states she is not in need of triage or care today and will call back to schedule appt upon getting her insurance going.    Feli Rascon RN, Nurse Advisor 4:32 PM 2/8/2022  COVID 19 Nurse Triage Plan/Patient Instructions    Please be aware that novel coronavirus (COVID-19) may be circulating in the community. If you develop symptoms such as fever, cough, or SOB or if you have concerns about the presence of another infection including coronavirus (COVID-19), please contact your health care provider or visit [email or web address].     Disposition/Instructions    Home care recommended. Follow home care protocol based instructions.    Thank you for taking steps to prevent the spread of this virus.  o Limit your contact with others.  o Wear a simple mask to cover your cough.  o Wash your hands well and often.

## 2022-03-23 ENCOUNTER — TELEPHONE (OUTPATIENT)
Dept: FAMILY MEDICINE | Facility: CLINIC | Age: 57
End: 2022-03-23

## 2022-03-23 NOTE — TELEPHONE ENCOUNTER
"Patient walked into clinic \"coughed up a nodule from her throat along with blood\" and shortness of breath, per  staff.     Patient had appt with Dr. Herron at 7:30 but was a \"no show\". Writer took patient to patient room, no SOB with exertion.  Writer took patients vital signs at 830: O2 97, 67 HR, BP: 140/94. Patient denies patient denies SOB.     Patient reports that they coughed up a lump, patient has sample in container. Patient reports no symptoms, states that the were in the ER in February and few days later patient reports having shortness of breath and coughed up lump and blood. Per chart review patient spoke with FNA nurse on 2/8/22 regarding symptoms. Patient states they are no longer having symptoms since February incident.     Writer scheduled appointment with Dr. Allen at 930 to further discuss next steps.     Jerome Marr RN  [facility]          "